# Patient Record
(demographics unavailable — no encounter records)

---

## 2024-10-24 NOTE — HISTORY OF PRESENT ILLNESS
[FreeTextEntry1] : CC: 12 y 2 mo old right handed girl with mixed language disorder, speech articulation disorder, sleep difficulties, snoring, academic underachievement, ADHD, and ODD.Here for a follow up visit.  Interval history: Since last seen, Sarah's medication has been modified. The generic Methylphenidate was replaced by brand Jornay, due to ineffectiveness. She did well on the brand Jornay but, unfortunately, her insurance stopped covering its cost. She is now on generic extended release Methylphenidate, with some improvements noted. She is currently in 7th grade. Has an IEP. Classroom has a 20:2 ratio. She is receiving speech therapy and counseling. Walks to and from school. Most recent Neuropsychological evaluation (Dr Sorto [facility] 2019) confirmed the presence of ADHD, ODD, mixed language disorder, and speech articulation disorder.  General health is good. She is fully immunized. Sleep is still suboptimal. Falls asleep around 11 PM and wakes up around 7:45 AM. Shares bedroom with sister. Wakes up during the night to use the bathroom. Mom notices that she is quite tired most mornings. She has been receiving Melatonin supplementation (3 mg), but inconsistently.  Current CNS medication: Generic extended release Methylphenidate 36 mg QAM PRN Melatonin supplementation 5 mg  HPI: Sarah had been followed at [facility]. Good general health. Fully immunized. She has a history of strabismus (status post bilateral surgery). Poor sleep. She has a history of early childhood onset developmental and behavioral symptoms. Mom referred that she has "always been fidgety, impulsive, highly distractible". In addition, Sarah also had long standing history of speech delays. Mom referred that, when younger, she often "spoke her own language" in an "immature, childish" way. A Neuropsychological evaluation (Dr Sorto [facility] 2019) confirmed the presence of ADHD, ODD, mixed language disorder and speech articulation disorder.  Prior CNS medications: Generic Methylphenidate. Ineffective Brand Jornay. Effective. Discontinued due to cost.     history: Mother  Gestational diabetes, needed only dietary modifications FT, born via repeat C section BW 6 p 5 oz No NICU stay  Developmental history: Walked 11 mo First words 2 y. Toilet trained prior to 4th birthday Received EI services since age 2 y.  Family history: Healthy older sister Older maternal half sister with ADHD 2 healthy paternal half sisters Mom has a history of glaucoma, status post surgery. She also has a herniated disc, and dysphonia due to "growths" on vocal cords. She has anxiety disorder.  Social history: Lives with parents and older sister. Goes to school  Past surgical history: Status post strabismus surgery (bilateral, at age 18 mo)  Past medical history: Jargon, resolved Developmental delay disorder Sleep difficulties Strabismus ODD ADHD Speech articulation disorder Mixed language disorder Snoring Academic underachievement  Past surgical history: Status post strabismus surgery  Review of systems: General: Stable weight Skin: No rashes, lumps, color change, changes in hair/nails Head: No headaches, no head injury Eyes: No corrective eyeglasses. No discharge Ears: No discharge Nose/Sinuses: No congestion, discharge, epistaxis Mouth/Throat: Normal teeth and gums Neck: No lumps, stiffness Respiratory: No cough, hemoptysis. Snores. Cardiac: No edema GI: No constipation, bloating or diarrhea : No hematuria, dysuria, urgency or enuresis. Premenarcheal. Musculoskeletal: No joint inflammation or arthralgia Neuro: Sleep difficulties. Academic difficulties. Low attention span Psych: Fidgety.  Physical Exam: Well nourished, non-dysmorphic child, in no distress Face is symmetric Neck has full range of motion. No torticollis or webbing Chest clear, good air entry bilaterally S1S2, no murmurs Abdomen soft, non distended No skin lesions Hair has normal appearance and distribution Awake, alert, good eye contact Speaks in sentences Follows simple commands well Intact extraocular movements No nystagmus Normal muscle bulk No muscle weakness No dysmetria No ataxia No abnormal movements Intact gait Able to tip toe, heel, and forward tandem walk DTR trace 4 limbs  Assessment: 12 y 2 mo old right handed girl with mixed language disorder, speech articulation disorder, sleep difficulties, snoring, academic underachievement, ADHD, and ODD. Exhibiting some improvements on academic performance.  Plan: Sarah's visit today had a duration of 40 minutes (>50% of which was spent in direct counseling and coordination of her care). I personally reviewed Sarah's medical history, medical records, test results, recent developments, and then delineated next steps for her neurological care. Sarah's mom and I reviewed her constellation of neurological symptoms and ADHD. We talked about different treatment modalities and overall prognosis. We discussed further titration of the generic extended release Methylphenidate, and reviewed this medication's side effects profile.  1) Mom to use the patient portal for fluid communications 2) Continue IEP 3) Continue speech therapy and counseling at school 4) Increase generic extended release Methylphenidate from 36 mg QAM to 45 mg QAM 5) Continue PRN Melatonin supplementation 5 mg QHS 6) Sleep hygiene 7) Follow up 4 mo   Sarah's mom understands plan, agrees and wants to move forward. All of her questions were answered. IStop chedago.  Debbie Lopez MD Pediatric Neurologist and Clinical Neurophysiologist Director Pediatric Epilepsy [facility] at [facility] Clinical Professor of Neurology and Pediatrics, [employer] at [facility]

## 2025-02-04 NOTE — HISTORY OF PRESENT ILLNESS
[FreeTextEntry1] : CC: 12 y 6 mo old right handed girl with mixed language disorder, speech articulation disorder, sleep difficulties, snoring, academic underachievement, ADHD, and ODD. Here for a follow up visit.  Interval history: Since last seen, Sarah's medication has been modified. The generic extended release Methylphenidate has been titrated. Mom has not yet met with school team to understand if Sarah has shown any benefits from the increased dose. Sarah is in 7th grade. Has an IEP. Classroom has a 20:2 ratio. She is receiving speech therapy and counseling. Walks to and from school. Most recent Neuropsychological evaluation (Dr Sorto [facility] 2019) confirmed the presence of ADHD, ODD, mixed language disorder, and speech articulation disorder.  General health is good. She is fully immunized. Sleep is still suboptimal. Falls asleep around 11 PM and wakes up around 7:45 AM. Shares bedroom with sister. Wakes up during the night to use the bathroom. Mom notices that she is quite tired most mornings. She has been receiving Melatonin supplementation (3 mg), but inconsistently.  Current CNS medication: Generic extended release Methylphenidate 54 mg QAM PRN Melatonin supplementation 5 mg  HPI: Sarah had been followed at [facility]. Good general health. Fully immunized. She has a history of strabismus (status post bilateral surgery). Poor sleep. She has a history of early childhood onset developmental and behavioral symptoms. Mom referred that she has "always been fidgety, impulsive, highly distractible". In addition, Sarah also had long standing history of speech delays. Mom referred that, when younger, she often "spoke her own language" in an "immature, childish" way. A Neuropsychological evaluation (Dr Sorto [facility] 2019) confirmed the presence of ADHD, ODD, mixed language disorder and speech articulation disorder.  Prior CNS medications: Generic Methylphenidate. Ineffective Brand Jornay. Effective. Discontinued due to cost.   history: Mother  Gestational diabetes, needed only dietary modifications FT, born via repeat C section BW 6 p 5 oz No NICU stay  Developmental history: Walked 11 mo First words 2 y. Toilet trained prior to 4th birthday Received EI services since age 2 y.  Family history: Healthy older sister Older maternal half sister with ADHD 2 healthy paternal half sisters Mom has a history of glaucoma, status post surgery. She also has a herniated disc, and dysphonia due to "growths" on vocal cords. She has anxiety disorder.  Social history: Lives with parents and older sister. Goes to school  Past surgical history: Status post strabismus surgery (bilateral, at age 18 mo)  Past medical history: Jargon, resolved Developmental delay disorder Sleep difficulties Strabismus ODD ADHD Speech articulation disorder Mixed language disorder Snoring Academic underachievement  Past surgical history: Status post strabismus surgery  Review of systems: General: Stable weight Skin: No rashes, lumps, color change, changes in hair/nails Head: No headaches, no head injury Eyes: No corrective eyeglasses. No discharge Ears: No discharge Nose/Sinuses: No congestion, discharge, epistaxis Mouth/Throat: Normal teeth and gums Neck: No lumps, stiffness Respiratory: No cough, hemoptysis. Snores. Cardiac: No edema GI: No constipation, bloating or diarrhea : No hematuria, dysuria, urgency or enuresis. Premenarcheal. Musculoskeletal: No joint inflammation or arthralgia Neuro: Sleep difficulties. Academic difficulties. Low attention span Psych: Fidgety.  Physical Exam: Well nourished, non-dysmorphic child, in no distress Face is symmetric Neck has full range of motion. No torticollis or webbing Chest clear, good air entry bilaterally S1S2, no murmurs Abdomen soft, non distended No skin lesions Hair has normal appearance and distribution Awake, alert, good eye contact Speaks in sentences Follows simple commands well Intact extraocular movements No nystagmus Normal muscle bulk No muscle weakness No dysmetria No ataxia No abnormal movements Intact gait Able to tip toe, heel, and forward tandem walk DTR trace 4 limbs  Assessment: 12 y 6 mo old right handed girl with mixed language disorder, speech articulation disorder, sleep difficulties, snoring, academic underachievement, ADHD, and ODD. Exhibiting some improvements on academic performance.  Plan: Sarah's visit today had a duration of 40 minutes (>50% of which was spent in direct counseling and coordination of her care). I personally reviewed Sarah's medical history, medical records, test results, recent developments, and then delineated next steps for her neurological care. Sarah's mom and I reviewed her constellation of neurological symptoms and ADHD. We talked about different treatment modalities and overall prognosis. We reviewed her medication's side effects profile. We discussed need for mom to reach out to school to get feedback on how is Sarah doing on the current dose of the medication.  1) Mom to use the patient portal for fluid communications 2) Continue IEP 3) Continue speech therapy and counseling at school 4) Continue generic extended release Methylphenidate 54 mg QAM 5) Continue PRN Melatonin supplementation 5 mg QHS 6) Sleep hygiene 7) Follow up 2-3 mo   Sarah's mom understands plan, agrees and wants to move forward. All of her questions were answered. ISkamran [place].  Debbie Lopez MD Pediatric Neurologist and Clinical Neurophysiologist Director Pediatric Epilepsy [facility] at [facility] Clinical Professor of Neurology and Pediatrics, [employer] at [facility]

## 2025-03-31 NOTE — HISTORY OF PRESENT ILLNESS
[FreeTextEntry1] : Telemedicine visit: Patient Location at time of Video Visit: Sarah and her mom were home during the visit. Physician Location at time of Visit: [address] [place] [address] 8th Floor [facility] [id number] Other Participants Present: None  I obtained parent consent and agreement for this video encounter. Additionally, I reviewed with the parent and addressed all questions regarding the disposition plan and follow-up instructions including any pertinent findings. The parent has acknowledged understanding of this information. I informed the parent that a copy of their after-visit summary for this visit is available for her to refer to within the secure patient portal.  CC: 12 y 7 mo old right handed girl with mixed language disorder, speech articulation disorder, sleep difficulties, snoring, academic underachievement, ADHD, and ODD. Telemedicine video follow up visit.  Interval history: Since last seen, Sarah's medication has been modified. The generic extended release Methylphenidate has not been titrated. Mom was able to talk to the teachers, and was told that Sarah continues to be easily distractible. Sraah is in 7th grade. Has an IEP. Classroom has a 20:2 ratio. She is receiving speech therapy and counseling. Walks to and from school. Most recent Neuropsychological evaluation (Dr Macario KAUFMAN 2019) confirmed the presence of ADHD, ODD, mixed language disorder, and speech articulation disorder.  General health is good. She is fully immunized. Sleep is still suboptimal. Falls asleep around 11 PM and wakes up around 7:45 AM. Shares bedroom with sister. Wakes up during the night to use the bathroom. Mom notices that she is quite tired most mornings. She has been receiving Melatonin supplementation (3 mg), but inconsistently.  Current CNS medication: Generic extended release Methylphenidate 54 mg QAM PRN Melatonin supplementation 5 mg  HPI: Sarah had been followed at [facility]. Good general health. Fully immunized. She has a history of strabismus (status post bilateral surgery). Poor sleep. She has a history of early childhood onset developmental and behavioral symptoms. Mom referred that she has "always been fidgety, impulsive, highly distractible". In addition, Sarah also had long standing history of speech delays. Mom referred that, when younger, she often "spoke her own language" in an "immature, childish" way. A Neuropsychological evaluation (Dr Macario KAUFMAN 2019) confirmed the presence of ADHD, ODD, mixed language disorder and speech articulation disorder.  Prior CNS medications: Generic Methylphenidate. Ineffective Brand Jornay. Effective. Discontinued due to cost.   history: Mother  Gestational diabetes, needed only dietary modifications FT, born via repeat C section BW 6 p 5 oz No NICU stay  Developmental history: Walked 11 mo First words 2 y. Toilet trained prior to 4th birthday Received EI services since age 2 y.  Family history: Healthy older sister Older maternal half sister with ADHD 2 healthy paternal half sisters Mom has a history of glaucoma, status post surgery. She also has a herniated disc, and dysphonia due to "growths" on vocal cords. She has anxiety disorder.  Social history: Lives with parents and older sister. Goes to school  Past surgical history: Status post strabismus surgery (bilateral, at age 18 mo)  Past medical history: Jargon, resolved Developmental delay disorder Sleep difficulties Strabismus ODD ADHD Speech articulation disorder Mixed language disorder Snoring Academic underachievement  Past surgical history: Status post strabismus surgery  Review of systems: General: Stable weight Skin: No rashes, lumps, color change, changes in hair/nails Head: No headaches, no head injury Eyes: No corrective eyeglasses. No discharge Ears: No discharge Nose/Sinuses: No congestion, discharge, epistaxis Mouth/Throat: Normal teeth and gums Neck: No lumps, stiffness Respiratory: No cough, hemoptysis. Snores. Cardiac: No edema GI: No constipation, bloating or diarrhea : No hematuria, dysuria, urgency or enuresis. Premenarcheal. Musculoskeletal: No joint inflammation or arthralgia Neuro: Sleep difficulties. Academic difficulties. Low attention span Psych: Fidgety.  Physical Exam: Deferred  Assessment: 12 y 7 mo old right handed girl with mixed language disorder, speech articulation disorder, sleep difficulties, snoring, academic underachievement, ADHD, and ODD. Exhibiting mild improvements on academic performance.  Plan: Sarah's televisit today had a duration of 30 minutes (>50% of which was spent in direct counseling and coordination of her care). I personally reviewed Sarah's medical history, medical records, test results, recent developments, and then delineated next steps for her neurological care. Sarah's mom and I reviewed her constellation of neurological symptoms and ADHD. We talked about different treatment modalities and overall prognosis. We reviewed her medication's side effects profile. Will titrate the medication further.  1) Mom to use the patient portal for fluid communications 2) Continue IEP 3) Continue speech therapy and counseling at school 4) Increase generic extended release Methylphenidate from 54 mg QAM, to 72 mg QAM. If no improvements noted, we should try to get insurance to cover Jeffery, as she was doing very well on it. 5) Continue PRN Melatonin supplementation 5 mg QHS 6) Sleep hygiene 7) Follow up 2-3 mo   Sarah's mom understands plan, agrees and wants to move forward. All of her questions were answered. IStop cheked.  Debbie Lopez MD Pediatric Neurologist and Clinical Neurophysiologist Director Pediatric Epilepsy [facility] at [facility]